# Patient Record
Sex: FEMALE | Race: WHITE | Employment: STUDENT | URBAN - METROPOLITAN AREA
[De-identification: names, ages, dates, MRNs, and addresses within clinical notes are randomized per-mention and may not be internally consistent; named-entity substitution may affect disease eponyms.]

---

## 2017-06-29 ENCOUNTER — OFFICE VISIT (OUTPATIENT)
Dept: INTERNAL MEDICINE CLINIC | Facility: CLINIC | Age: 22
End: 2017-06-29

## 2017-06-29 VITALS
BODY MASS INDEX: 23.27 KG/M2 | WEIGHT: 138 LBS | DIASTOLIC BLOOD PRESSURE: 58 MMHG | OXYGEN SATURATION: 98 % | RESPIRATION RATE: 16 BRPM | HEIGHT: 64.75 IN | SYSTOLIC BLOOD PRESSURE: 122 MMHG | TEMPERATURE: 98 F | HEART RATE: 88 BPM

## 2017-06-29 DIAGNOSIS — Z00.00 PHYSICAL EXAM: Primary | ICD-10-CM

## 2017-06-29 PROCEDURE — 99395 PREV VISIT EST AGE 18-39: CPT | Performed by: FAMILY MEDICINE

## 2017-06-29 NOTE — PROGRESS NOTES
Rakan Whiting is a 23year old female who presents for physical      Here for sports physical  Plays soccer in college  Pt also needs a sport PE. Pt denies any CP or SOB with exercise. No hx asthma. No fam hx of heart disease at a young age.   Pt denies an murmur  GI: good BS's and no masses, HSM or tenderness  MUSCULOSKELETAL: no evidence of scoliosis  EXTREMITIES: no deformity, no swelling  NEURO: cranial nerves are intact and motor and sensory are grossly intact; Gait normal    ASSESSMENT AND PLAN:  [facility], [place], operated by [facility]

## 2017-06-29 NOTE — PROGRESS NOTES
Pt's  verified  Per DR. Shiv Burgos performed an in office urine dip-normal results Alsp performed eye screen no contacts or corrected vision 20/20